# Patient Record
Sex: FEMALE | Race: WHITE
[De-identification: names, ages, dates, MRNs, and addresses within clinical notes are randomized per-mention and may not be internally consistent; named-entity substitution may affect disease eponyms.]

---

## 2018-01-09 ENCOUNTER — RX RENEWAL (OUTPATIENT)
Age: 33
End: 2018-01-09

## 2018-01-09 DIAGNOSIS — G40.109 LOCALIZATION-RELATED (FOCAL) (PARTIAL) SYMPTOMATIC EPILEPSY AND EPILEPTIC SYNDROMES WITH SIMPLE PARTIAL SEIZURES, NOT INTRACTABLE, W/OUT STATUS EPILEPTICUS: ICD-10-CM

## 2018-01-09 PROBLEM — Z00.00 ENCOUNTER FOR PREVENTIVE HEALTH EXAMINATION: Status: ACTIVE | Noted: 2018-01-09

## 2018-02-21 ENCOUNTER — APPOINTMENT (OUTPATIENT)
Dept: NEUROLOGY | Facility: CLINIC | Age: 33
End: 2018-02-21
Payer: COMMERCIAL

## 2018-02-21 PROCEDURE — 95819 EEG AWAKE AND ASLEEP: CPT

## 2018-02-22 ENCOUNTER — OTHER (OUTPATIENT)
Age: 33
End: 2018-02-22

## 2019-01-29 ENCOUNTER — MESSAGE (OUTPATIENT)
Age: 34
End: 2019-01-29

## 2019-12-10 ENCOUNTER — APPOINTMENT (OUTPATIENT)
Dept: NEUROLOGY | Facility: CLINIC | Age: 34
End: 2019-12-10
Payer: MEDICARE

## 2019-12-10 VITALS
DIASTOLIC BLOOD PRESSURE: 86 MMHG | HEIGHT: 67 IN | HEART RATE: 66 BPM | WEIGHT: 180 LBS | BODY MASS INDEX: 28.25 KG/M2 | SYSTOLIC BLOOD PRESSURE: 132 MMHG

## 2019-12-10 PROCEDURE — 99214 OFFICE O/P EST MOD 30 MIN: CPT

## 2019-12-10 NOTE — HISTORY OF PRESENT ILLNESS
[FreeTextEntry1] : cc- seizure disorder followed at [facility] previously \par \par 35 y/o [place] woman was well until 2008 [place] patterns of items on a shelf.  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  Several years ago may have had an aura with some nystagmus several years ago. That event was not provoked other than [place] crowded park, had not eaten.  \par \par No other c/o.\par \par meds- \par levetiracetam 250mg bid\par OCPs\par lexapro 20mg/d\par MVI and folate\par

## 2019-12-10 NOTE — PHYSICAL EXAM
[FreeTextEntry1] : Neuro - alert and oriented x3 attn conc lang nl\par CN intact in detail\par Motor 5/5, no drift \par Sens wnl\par CSg wnl

## 2019-12-10 NOTE — ASSESSMENT
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 \par - cont lev 250 bid and lexapro\par - cont mvi and folate\par - need old [facility] records\par - RTC 6m\par

## 2020-11-04 ENCOUNTER — RX CHANGE (OUTPATIENT)
Age: 35
End: 2020-11-04

## 2020-12-15 ENCOUNTER — APPOINTMENT (OUTPATIENT)
Dept: NEUROLOGY | Facility: CLINIC | Age: 35
End: 2020-12-15
Payer: MEDICARE

## 2020-12-15 VITALS
WEIGHT: 167 LBS | HEIGHT: 67 IN | DIASTOLIC BLOOD PRESSURE: 68 MMHG | HEART RATE: 69 BPM | SYSTOLIC BLOOD PRESSURE: 103 MMHG | BODY MASS INDEX: 26.21 KG/M2

## 2020-12-15 VITALS — TEMPERATURE: 97.9 F

## 2020-12-15 PROCEDURE — 99072 ADDL SUPL MATRL&STAF TM PHE: CPT

## 2020-12-15 PROCEDURE — 95816 EEG AWAKE AND DROWSY: CPT

## 2020-12-15 PROCEDURE — 99214 OFFICE O/P EST MOD 30 MIN: CPT

## 2020-12-15 NOTE — PHYSICAL EXAM
[FreeTextEntry1] : Neuro - alert and oriented x3 attn conc lang nl\par CN intact in detail\par Motor 5/5, no drift \par Sens wnl\par CSg wnl. \par

## 2020-12-15 NOTE — ASSESSMENT
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

## 2020-12-15 NOTE — HISTORY OF PRESENT ILLNESS
[FreeTextEntry1] : cc- sz\par \par Seen one yr ago. No auras or sz.  Rarely in past 4 yrs had some nystagmus when excessive visual stimulations.\par \par meds- \par levetiracetam 250mg bid\par OCPs\par lexapro 20mg/d\par MVI and folate\par spironolactone 50 bid no

## 2021-12-14 ENCOUNTER — APPOINTMENT (OUTPATIENT)
Dept: NEUROLOGY | Facility: CLINIC | Age: 36
End: 2021-12-14
Payer: COMMERCIAL

## 2021-12-14 VITALS
HEIGHT: 67 IN | HEART RATE: 76 BPM | DIASTOLIC BLOOD PRESSURE: 80 MMHG | BODY MASS INDEX: 27.47 KG/M2 | WEIGHT: 175 LBS | SYSTOLIC BLOOD PRESSURE: 113 MMHG

## 2021-12-14 PROCEDURE — 99214 OFFICE O/P EST MOD 30 MIN: CPT

## 2021-12-14 NOTE — ASSESSMENT
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

## 2021-12-14 NOTE — HISTORY OF PRESENT ILLNESS
[FreeTextEntry1] : cc- seizure\par \par Seen one yr ago. No auras or sz. However, one week ago event of vertigo with probable nystagmus- BPV.\par  Rarely in past 4 yrs had some nystagmus when excessive visual stimulations.\par Feels well\par Not planning on getting pregnant.\par \par meds- \par levetiracetam 250mg bid\par OCPs\par lexapro 20mg/d\par MVI and folate\par spironolactone 50 bid \par

## 2022-12-14 ENCOUNTER — APPOINTMENT (OUTPATIENT)
Dept: NEUROLOGY | Facility: CLINIC | Age: 37
End: 2022-12-14

## 2023-01-10 ENCOUNTER — APPOINTMENT (OUTPATIENT)
Dept: NEUROLOGY | Facility: CLINIC | Age: 38
End: 2023-01-10
Payer: COMMERCIAL

## 2023-01-10 VITALS
HEART RATE: 64 BPM | WEIGHT: 188 LBS | BODY MASS INDEX: 29.51 KG/M2 | HEIGHT: 67 IN | DIASTOLIC BLOOD PRESSURE: 78 MMHG | SYSTOLIC BLOOD PRESSURE: 117 MMHG

## 2023-01-10 PROCEDURE — 99213 OFFICE O/P EST LOW 20 MIN: CPT

## 2023-01-10 NOTE — HISTORY OF PRESENT ILLNESS
[FreeTextEntry1] : cc- seizure\par \par Seen one yr ago. No auras or seizures. No vertigo.\par No visually induced nystagmus.\par Last GTC 2009.\par MRI - 8/12- small left cerebellar DVA\par \par EEG 2009- RFT slow\par meds- \par levetiracetam 250mg bid\par OCPs\par lexapro 20mg/d\par MVI and folate\par  \par

## 2023-01-10 NOTE — ASSESSMENT
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

## 2023-09-27 RX ORDER — LEVETIRACETAM 250 MG/1
250 TABLET, FILM COATED ORAL TWICE DAILY
Qty: 60 | Refills: 11 | Status: ACTIVE | COMMUNITY
Start: 2018-01-09 | End: 1900-01-01

## 2024-10-03 ENCOUNTER — RX RENEWAL (OUTPATIENT)
Age: 39
End: 2024-10-03

## 2025-04-24 ENCOUNTER — NON-APPOINTMENT (OUTPATIENT)
Age: 40
End: 2025-04-24

## 2025-04-29 ENCOUNTER — TRANSCRIPTION ENCOUNTER (OUTPATIENT)
Age: 40
End: 2025-04-29

## 2025-04-29 ENCOUNTER — APPOINTMENT (OUTPATIENT)
Dept: NEUROLOGY | Facility: CLINIC | Age: 40
End: 2025-04-29
Payer: COMMERCIAL

## 2025-04-29 VITALS
HEART RATE: 65 BPM | BODY MASS INDEX: 29.51 KG/M2 | WEIGHT: 188 LBS | RESPIRATION RATE: 18 BRPM | DIASTOLIC BLOOD PRESSURE: 80 MMHG | OXYGEN SATURATION: 98 % | SYSTOLIC BLOOD PRESSURE: 120 MMHG | HEIGHT: 67 IN

## 2025-04-29 PROCEDURE — 99213 OFFICE O/P EST LOW 20 MIN: CPT
